# Patient Record
Sex: FEMALE | Race: BLACK OR AFRICAN AMERICAN | NOT HISPANIC OR LATINO | Employment: UNEMPLOYED | URBAN - METROPOLITAN AREA
[De-identification: names, ages, dates, MRNs, and addresses within clinical notes are randomized per-mention and may not be internally consistent; named-entity substitution may affect disease eponyms.]

---

## 2024-08-27 ENCOUNTER — APPOINTMENT (EMERGENCY)
Dept: CT IMAGING | Facility: HOSPITAL | Age: 34
End: 2024-08-27
Payer: COMMERCIAL

## 2024-08-27 ENCOUNTER — HOSPITAL ENCOUNTER (EMERGENCY)
Facility: HOSPITAL | Age: 34
Discharge: HOME | End: 2024-08-27
Attending: EMERGENCY MEDICINE | Admitting: EMERGENCY MEDICINE
Payer: COMMERCIAL

## 2024-08-27 VITALS
HEART RATE: 60 BPM | RESPIRATION RATE: 20 BRPM | OXYGEN SATURATION: 100 % | TEMPERATURE: 98.6 F | SYSTOLIC BLOOD PRESSURE: 137 MMHG | DIASTOLIC BLOOD PRESSURE: 71 MMHG

## 2024-08-27 DIAGNOSIS — R51.9 HEADACHE: ICD-10-CM

## 2024-08-27 DIAGNOSIS — G93.2 IDIOPATHIC INTRACRANIAL HYPERTENSION: ICD-10-CM

## 2024-08-27 DIAGNOSIS — J32.9 SINUSITIS: ICD-10-CM

## 2024-08-27 DIAGNOSIS — M79.10 MYALGIA: Primary | ICD-10-CM

## 2024-08-27 DIAGNOSIS — E23.6 EMPTY SELLA (HCC): ICD-10-CM

## 2024-08-27 LAB
ALBUMIN SERPL BCG-MCNC: 3.9 G/DL (ref 3.5–5)
ALP SERPL-CCNC: 52 U/L (ref 34–104)
ALT SERPL W P-5'-P-CCNC: 9 U/L (ref 7–52)
ANION GAP SERPL CALCULATED.3IONS-SCNC: 5 MMOL/L (ref 4–13)
AST SERPL W P-5'-P-CCNC: 14 U/L (ref 13–39)
BASOPHILS # BLD AUTO: 0.03 THOUSANDS/ÂΜL (ref 0–0.1)
BASOPHILS NFR BLD AUTO: 1 % (ref 0–1)
BILIRUB SERPL-MCNC: 0.47 MG/DL (ref 0.2–1)
BUN SERPL-MCNC: 12 MG/DL (ref 5–25)
CALCIUM SERPL-MCNC: 8.9 MG/DL (ref 8.4–10.2)
CHLORIDE SERPL-SCNC: 102 MMOL/L (ref 96–108)
CO2 SERPL-SCNC: 28 MMOL/L (ref 21–32)
CREAT SERPL-MCNC: 1.02 MG/DL (ref 0.6–1.3)
EOSINOPHIL # BLD AUTO: 0.07 THOUSAND/ÂΜL (ref 0–0.61)
EOSINOPHIL NFR BLD AUTO: 1 % (ref 0–6)
ERYTHROCYTE [DISTWIDTH] IN BLOOD BY AUTOMATED COUNT: 13.9 % (ref 11.6–15.1)
GFR SERPL CREATININE-BSD FRML MDRD: 70 ML/MIN/1.73SQ M
GLUCOSE SERPL-MCNC: 90 MG/DL (ref 65–140)
HCG SERPL QL: NEGATIVE
HCT VFR BLD AUTO: 35.8 % (ref 34.8–46.1)
HGB BLD-MCNC: 11.6 G/DL (ref 11.5–15.4)
IMM GRANULOCYTES # BLD AUTO: 0.04 THOUSAND/UL (ref 0–0.2)
IMM GRANULOCYTES NFR BLD AUTO: 1 % (ref 0–2)
LYMPHOCYTES # BLD AUTO: 2.23 THOUSANDS/ÂΜL (ref 0.6–4.47)
LYMPHOCYTES NFR BLD AUTO: 34 % (ref 14–44)
MAGNESIUM SERPL-MCNC: 1.8 MG/DL (ref 1.9–2.7)
MCH RBC QN AUTO: 25 PG (ref 26.8–34.3)
MCHC RBC AUTO-ENTMCNC: 32.4 G/DL (ref 31.4–37.4)
MCV RBC AUTO: 77 FL (ref 82–98)
MONOCYTES # BLD AUTO: 0.6 THOUSAND/ÂΜL (ref 0.17–1.22)
MONOCYTES NFR BLD AUTO: 9 % (ref 4–12)
NEUTROPHILS # BLD AUTO: 3.55 THOUSANDS/ÂΜL (ref 1.85–7.62)
NEUTS SEG NFR BLD AUTO: 54 % (ref 43–75)
NRBC BLD AUTO-RTO: 0 /100 WBCS
PLATELET # BLD AUTO: 319 THOUSANDS/UL (ref 149–390)
PMV BLD AUTO: 10.1 FL (ref 8.9–12.7)
POTASSIUM SERPL-SCNC: 3.9 MMOL/L (ref 3.5–5.3)
PROT SERPL-MCNC: 7.6 G/DL (ref 6.4–8.4)
RBC # BLD AUTO: 4.64 MILLION/UL (ref 3.81–5.12)
SODIUM SERPL-SCNC: 135 MMOL/L (ref 135–147)
TSH SERPL DL<=0.05 MIU/L-ACNC: 1.54 UIU/ML (ref 0.45–4.5)
WBC # BLD AUTO: 6.52 THOUSAND/UL (ref 4.31–10.16)

## 2024-08-27 PROCEDURE — 84443 ASSAY THYROID STIM HORMONE: CPT

## 2024-08-27 PROCEDURE — 99284 EMERGENCY DEPT VISIT MOD MDM: CPT | Performed by: EMERGENCY MEDICINE

## 2024-08-27 PROCEDURE — 83735 ASSAY OF MAGNESIUM: CPT

## 2024-08-27 PROCEDURE — 96375 TX/PRO/DX INJ NEW DRUG ADDON: CPT

## 2024-08-27 PROCEDURE — 80053 COMPREHEN METABOLIC PANEL: CPT

## 2024-08-27 PROCEDURE — 93005 ELECTROCARDIOGRAM TRACING: CPT

## 2024-08-27 PROCEDURE — 99285 EMERGENCY DEPT VISIT HI MDM: CPT

## 2024-08-27 PROCEDURE — 87636 SARSCOV2 & INF A&B AMP PRB: CPT

## 2024-08-27 PROCEDURE — 70450 CT HEAD/BRAIN W/O DYE: CPT

## 2024-08-27 PROCEDURE — 36415 COLL VENOUS BLD VENIPUNCTURE: CPT

## 2024-08-27 PROCEDURE — 96365 THER/PROPH/DIAG IV INF INIT: CPT

## 2024-08-27 PROCEDURE — 85025 COMPLETE CBC W/AUTO DIFF WBC: CPT

## 2024-08-27 PROCEDURE — 86618 LYME DISEASE ANTIBODY: CPT

## 2024-08-27 PROCEDURE — 96367 TX/PROPH/DG ADDL SEQ IV INF: CPT

## 2024-08-27 PROCEDURE — 84703 CHORIONIC GONADOTROPIN ASSAY: CPT

## 2024-08-27 RX ORDER — BUPROPION HYDROCHLORIDE 300 MG/1
300 TABLET ORAL DAILY
COMMUNITY

## 2024-08-27 RX ORDER — TRAZODONE HYDROCHLORIDE 300 MG/1
300 TABLET ORAL
COMMUNITY

## 2024-08-27 RX ORDER — DIPHENHYDRAMINE HYDROCHLORIDE 50 MG/ML
25 INJECTION INTRAMUSCULAR; INTRAVENOUS ONCE
Status: COMPLETED | OUTPATIENT
Start: 2024-08-27 | End: 2024-08-27

## 2024-08-27 RX ORDER — METOCLOPRAMIDE HYDROCHLORIDE 5 MG/ML
10 INJECTION INTRAMUSCULAR; INTRAVENOUS ONCE
Status: COMPLETED | OUTPATIENT
Start: 2024-08-27 | End: 2024-08-27

## 2024-08-27 RX ORDER — MAGNESIUM SULFATE HEPTAHYDRATE 40 MG/ML
2 INJECTION, SOLUTION INTRAVENOUS ONCE
Status: COMPLETED | OUTPATIENT
Start: 2024-08-27 | End: 2024-08-27

## 2024-08-27 RX ORDER — KETOROLAC TROMETHAMINE 30 MG/ML
15 INJECTION, SOLUTION INTRAMUSCULAR; INTRAVENOUS ONCE
Status: COMPLETED | OUTPATIENT
Start: 2024-08-27 | End: 2024-08-27

## 2024-08-27 RX ADMIN — SODIUM CHLORIDE, SODIUM LACTATE, POTASSIUM CHLORIDE, AND CALCIUM CHLORIDE 1000 ML: .6; .31; .03; .02 INJECTION, SOLUTION INTRAVENOUS at 21:19

## 2024-08-27 RX ADMIN — KETOROLAC TROMETHAMINE 15 MG: 30 INJECTION, SOLUTION INTRAMUSCULAR; INTRAVENOUS at 21:08

## 2024-08-27 RX ADMIN — DIPHENHYDRAMINE HYDROCHLORIDE 25 MG: 50 INJECTION, SOLUTION INTRAMUSCULAR; INTRAVENOUS at 21:09

## 2024-08-27 RX ADMIN — METOCLOPRAMIDE 10 MG: 5 INJECTION, SOLUTION INTRAMUSCULAR; INTRAVENOUS at 21:10

## 2024-08-27 RX ADMIN — MAGNESIUM SULFATE HEPTAHYDRATE 2 G: 40 INJECTION, SOLUTION INTRAVENOUS at 21:15

## 2024-08-27 NOTE — ED PROVIDER NOTES
"History  Chief Complaint   Patient presents with    Medical Problem     Pt BIB EMS from work. Pt states that she has been experiencing a headache and dizziness for the last several days. Pt also reports feeling more weak and tired than normal.      34 YOF with no documented PMH presents today with multiple complaints. Pt states she has had a headache for about 2 weeks, waxes and wanes in intensity. Is in the middle of her eyes. No medications seem to help. States she feels \"not myself\". Admits to body aches. Reports she feels like she has been losing track of time recently. States today she had a near syncopal episode at work, felt hot and sweating. Did not lose consciousness. States she has been having some intermittent light headedness- describes it as both off balance and room spinning. Reports back pain that has been ongoing \"for a while\". Denies urinary symptoms. Denies fevers, chills, chest pain, SOB, neck pain, rashes. No changes in vision. No recent travel.         Prior to Admission Medications   Prescriptions Last Dose Informant Patient Reported? Taking?   buPROPion (WELLBUTRIN XL) 300 mg 24 hr tablet Past Month  Yes Yes   Sig: Take 300 mg by mouth daily   traZODone (DESYREL) 300 MG tablet Past Month  Yes Yes   Sig: Take 300 mg by mouth daily at bedtime      Facility-Administered Medications: None       No past medical history on file.    No past surgical history on file.    No family history on file.  I have reviewed and agree with the history as documented.    No existing history information found.  No existing history information found.       Review of Systems   Constitutional:  Negative for chills and fever.   HENT:  Negative for congestion.    Respiratory:  Negative for cough and shortness of breath.    Cardiovascular:  Negative for chest pain and palpitations.   Gastrointestinal:  Negative for abdominal pain, diarrhea, nausea and vomiting.   Musculoskeletal:  Positive for myalgias.   Skin:  Negative " for rash.   Neurological:  Positive for dizziness, weakness, light-headedness and headaches.   All other systems reviewed and are negative.      Physical Exam  Physical Exam  Vitals and nursing note reviewed.   Constitutional:       General: She is not in acute distress.     Appearance: Normal appearance. She is well-developed. She is obese.   HENT:      Head: Normocephalic and atraumatic.   Eyes:      Conjunctiva/sclera: Conjunctivae normal.   Cardiovascular:      Rate and Rhythm: Normal rate and regular rhythm.      Heart sounds: No murmur heard.  Pulmonary:      Effort: Pulmonary effort is normal. No respiratory distress.      Breath sounds: Normal breath sounds.   Abdominal:      Palpations: Abdomen is soft.      Tenderness: There is no abdominal tenderness.   Musculoskeletal:         General: No swelling.      Cervical back: Normal range of motion and neck supple.   Skin:     General: Skin is warm and dry.      Capillary Refill: Capillary refill takes less than 2 seconds.   Neurological:      General: No focal deficit present.      Mental Status: She is alert and oriented to person, place, and time.   Psychiatric:         Mood and Affect: Mood normal.         Behavior: Behavior normal.         Vital Signs  ED Triage Vitals   Temperature Pulse Respirations Blood Pressure SpO2   08/27/24 1733 08/27/24 1720 08/27/24 1720 08/27/24 1720 08/27/24 1720   98.6 °F (37 °C) 60 20 137/71 100 %      Temp Source Heart Rate Source Patient Position - Orthostatic VS BP Location FiO2 (%)   08/27/24 1733 08/27/24 1720 08/27/24 1720 08/27/24 1720 --   Oral Monitor Lying Right arm       Pain Score       08/27/24 2106       9           Vitals:    08/27/24 1720   BP: 137/71   Pulse: 60   Patient Position - Orthostatic VS: Lying         Visual Acuity      ED Medications  Medications   ketorolac (TORADOL) injection 15 mg (15 mg Intravenous Given 8/27/24 2108)   metoclopramide (REGLAN) injection 10 mg (10 mg Intravenous Given 8/27/24  2110)   diphenhydrAMINE (BENADRYL) injection 25 mg (25 mg Intravenous Given 8/27/24 2109)   lactated ringers bolus 1,000 mL (1,000 mL Intravenous New Bag 8/27/24 2119)   magnesium sulfate 2 g/50 mL IVPB (premix) 2 g (0 g Intravenous Stopped 8/27/24 2143)       Diagnostic Studies  Results Reviewed       Procedure Component Value Units Date/Time    hCG, qualitative pregnancy [[id number]]  (Normal) Collected: 08/27/24 1741    Lab Status: Final result Specimen: Blood from Arm, Left Updated: 08/27/24 2043     Preg, Serum Negative    TSH, 3rd generation with Free T4 reflex [[id number]]  (Normal) Collected: 08/27/24 1741    Lab Status: Final result Specimen: Blood from Arm, Left Updated: 08/27/24 1831     TSH 3RD GENERATON 1.545 uIU/mL     Comprehensive metabolic panel [[id number]] Collected: 08/27/24 1741    Lab Status: Final result Specimen: Blood from Arm, Left Updated: 08/27/24 1817     Sodium 135 mmol/L      Potassium 3.9 mmol/L      Chloride 102 mmol/L      CO2 28 mmol/L      ANION GAP 5 mmol/L      BUN 12 mg/dL      Creatinine 1.02 mg/dL      Glucose 90 mg/dL      Calcium 8.9 mg/dL      AST 14 U/L      ALT 9 U/L      Alkaline Phosphatase 52 U/L      Total Protein 7.6 g/dL      Albumin 3.9 g/dL      Total Bilirubin 0.47 mg/dL      eGFR 70 ml/min/1.73sq m     Narrative:      National Kidney Disease Foundation guidelines for Chronic Kidney Disease (CKD):     Stage 1 with normal or high GFR (GFR > 90 mL/min/1.73 square meters)    Stage 2 Mild CKD (GFR = 60-89 mL/min/1.73 square meters)    Stage 3A Moderate CKD (GFR = 45-59 mL/min/1.73 square meters)    Stage 3B Moderate CKD (GFR = 30-44 mL/min/1.73 square meters)    Stage 4 Severe CKD (GFR = 15-29 mL/min/1.73 square meters)    Stage 5 End Stage CKD (GFR <15 mL/min/1.73 square meters)  Note: GFR calculation is accurate only with a steady state creatinine    Magnesium [[id number]]  (Abnormal) Collected: 08/27/24 1741    Lab Status: Final result Specimen: Blood from Arm, Left  Updated: 08/27/24 1817     Magnesium 1.8 mg/dL     CBC and differential [[id number]]  (Abnormal) Collected: 08/27/24 1741    Lab Status: Final result Specimen: Blood from Arm, Left Updated: 08/27/24 1757     WBC 6.52 Thousand/uL      RBC 4.64 Million/uL      Hemoglobin 11.6 g/dL      Hematocrit 35.8 %      MCV 77 fL      MCH 25.0 pg      MCHC 32.4 g/dL      RDW 13.9 %      MPV 10.1 fL      Platelets 319 Thousands/uL      nRBC 0 /100 WBCs      Segmented % 54 %      Immature Grans % 1 %      Lymphocytes % 34 %      Monocytes % 9 %      Eosinophils Relative 1 %      Basophils Relative 1 %      Absolute Neutrophils 3.55 Thousands/µL      Absolute Immature Grans 0.04 Thousand/uL      Absolute Lymphocytes 2.23 Thousands/µL      Absolute Monocytes 0.60 Thousand/µL      Eosinophils Absolute 0.07 Thousand/µL      Basophils Absolute 0.03 Thousands/µL     Lyme Total AB W Reflex to IGM/IGG [[id number]] Collected: 08/27/24 1741    Lab Status: In process Specimen: Blood from Arm, Left Updated: 08/27/24 1753    Narrative:      The following orders were created for panel order Lyme Total AB W Reflex to IGM/IGG.  Procedure                               Abnormality         Status                     ---------                               -----------         ------                     Lyme Total AB W Reflex t...[[id number]]                      In process                   Please view results for these tests on the individual orders.    Lyme Total AB W Reflex to IGM/IGG [[id number]] Collected: 08/27/24 1741    Lab Status: In process Specimen: Blood from Arm, Left Updated: 08/27/24 1753    FLU/COVID - if FLU clinically relevant [[id number]] Collected: 08/27/24 1741    Lab Status: In process Specimen: Nares from Nasopharyngeal Swab Updated: 08/27/24 1753                   CT head without contrast   Final Result by Pallav N Shah, MD (08/27 1859)      No acute intracranial abnormality.      Low normal ventricular size and prominence and  nearly empty sella raises the question of idiopathic intracranial hypertension. Patient history is recommended.                  Workstation performed: [id number]                    Procedures  Procedures         ED Course  ED Course as of 08/27/24 2222   Tue Aug 27, 2024   1902 CT head without contrast  No acute intracranial abnormality.     Low normal ventricular size and prominence and nearly empty sella raises the question of idiopathic intracranial hypertension. Patient history is recommended.     2120 Updated pt on results so far. Reports she feels like her symptoms may be related to her sinuses- reports pain and pressure. Admits to chills now    2151 Pt reports symptoms have improved.                                  SBIRT 22yo+      Flowsheet Row Most Recent Value   Initial Alcohol Screen: US AUDIT-C     1. How often do you have a drink containing alcohol? 0 Filed at: 08/27/2024 1723   2. How many drinks containing alcohol do you have on a typical day you are drinking?  0 Filed at: 08/27/2024 1723   3b. FEMALE Any Age, or MALE 65+: How often do you have 4 or more drinks on one occassion? 0 Filed at: 08/27/2024 1723   Audit-C Score 0 Filed at: 08/27/2024 1723   NAOMI: How many times in the past year have you...    Used an illegal drug or used a prescription medication for non-medical reasons? Never Filed at: 08/27/2024 1723                      Medical Decision Making  No WBC elevation to suspect infection somewhere. No electrolyte abnormalities. TSH normal. Mag normal. CT head showed question for idiopathic intracranial hypertension. Pt has no visual changes with the headache, so recommend neuro follow up. Headache and other symptoms did improve with medications here. Will treat for sinusitis per pt's request. Discussed motrin and tylenol for at home.     I have discussed the plan to discharge pt from ED. The patient was discharged in stable condition.  Patient ambulated off the department.  Extensive return  to emergency department precautions were discussed.  Follow up with appropriate providers including primary care physician was discussed.  Patient and/or their  primary decision maker expressed understanding.  Patient remained stable during entire emergency department stay.      Amount and/or Complexity of Data Reviewed  Labs: ordered.  Radiology: ordered. Decision-making details documented in ED Course.    Risk  Prescription drug management.                 Disposition  Final diagnoses:   Myalgia   Headache   Empty sella (HCC)   Idiopathic intracranial hypertension   Sinusitis     Time reflects when diagnosis was documented in both MDM as applicable and the Disposition within this note       Time User Action Codes Description Comment    8/27/2024  8:44 PM Jose Miguel Mullinsn Add [M79.10] Myalgia     8/27/2024  8:44 PM SusannaJose Miguel garnettn Add [R51.9] Headache     8/27/2024  9:42 PM SusannaJose Miguel garnettn Add [E23.6] Empty sella (HCC)     8/27/2024  9:42 PM SusannaErisilen Add [G93.2] Idiopathic intracranial hypertension     8/27/2024  9:51 PM Jose Miguel Mullinsn Add [J32.9] Sinusitis           ED Disposition       ED Disposition   Discharge    Condition   Stable    Date/Time   Tue Aug 27, 2024 2043    Comment   Elda Krueger discharge to home/self care.                   Follow-up Information       Follow up With Specialties Details Why Contact Info Additional Information    [facility]rology Schedule an appointment as soon as possible for a visit   [address]  [address] [facility] [address]  [phone] [facility], [address], [place] [address] [place], [place], [address]   [phone]            Patient's Medications   Discharge Prescriptions    AMOXICILLIN-CLAVULANATE (AUGMENTIN) 875-125 MG PER TABLET    Take 1 tablet by mouth every 12 (twelve) hours for 5 days       Start Date: 8/27/2024 End Date: 9/1/2024       Order Dose: 1 tablet        Quantity: 10 tablet    Refills: 0           PDMP Review       None            ED Provider  Electronically Signed by             Nayeli Mullins PA-C  08/27/24 8910

## 2024-08-28 LAB
ATRIAL RATE: 61 BPM
B BURGDOR IGG+IGM SER QL IA: NEGATIVE
FLUAV RNA RESP QL NAA+PROBE: NEGATIVE
FLUBV RNA RESP QL NAA+PROBE: NEGATIVE
P AXIS: 51 DEGREES
PR INTERVAL: 132 MS
QRS AXIS: 61 DEGREES
QRSD INTERVAL: 90 MS
QT INTERVAL: 398 MS
QTC INTERVAL: 400 MS
SARS-COV-2 RNA RESP QL NAA+PROBE: NEGATIVE
T WAVE AXIS: 37 DEGREES
VENTRICULAR RATE: 61 BPM

## 2024-08-28 PROCEDURE — 93010 ELECTROCARDIOGRAM REPORT: CPT | Performed by: INTERNAL MEDICINE

## 2024-08-28 NOTE — DISCHARGE INSTRUCTIONS
-schedule appointment with neurology   -take antibiotic as directed  -take motrin and tylenol for headaches and body aches

## 2024-10-08 ENCOUNTER — OFFICE VISIT (OUTPATIENT)
Dept: NEUROLOGY | Facility: CLINIC | Age: 34
End: 2024-10-08
Payer: MEDICARE

## 2024-10-08 VITALS
BODY MASS INDEX: 44.41 KG/M2 | HEIGHT: 68 IN | OXYGEN SATURATION: 98 % | TEMPERATURE: 97.3 F | HEART RATE: 75 BPM | WEIGHT: 293 LBS | SYSTOLIC BLOOD PRESSURE: 130 MMHG | DIASTOLIC BLOOD PRESSURE: 80 MMHG

## 2024-10-08 DIAGNOSIS — G43.009 MIGRAINE WITHOUT AURA AND WITHOUT STATUS MIGRAINOSUS, NOT INTRACTABLE: Primary | ICD-10-CM

## 2024-10-08 DIAGNOSIS — E23.6 EMPTY SELLA (HCC): ICD-10-CM

## 2024-10-08 DIAGNOSIS — R51.9 HEADACHE: ICD-10-CM

## 2024-10-08 DIAGNOSIS — G93.2 IDIOPATHIC INTRACRANIAL HYPERTENSION: ICD-10-CM

## 2024-10-08 PROCEDURE — 99204 OFFICE O/P NEW MOD 45 MIN: CPT | Performed by: NURSE PRACTITIONER

## 2024-10-08 RX ORDER — TOPIRAMATE 25 MG/1
TABLET, FILM COATED ORAL
Qty: 42 TABLET | Refills: 1 | Status: SHIPPED | OUTPATIENT
Start: 2024-10-08

## 2024-10-08 RX ORDER — TOPIRAMATE 25 MG/1
TABLET, FILM COATED ORAL
Qty: 42 TABLET | Refills: 0 | Status: SHIPPED | OUTPATIENT
Start: 2024-10-08 | End: 2024-10-08

## 2024-10-08 NOTE — PROGRESS NOTES
Neurology Ambulatory Visit  Name: Elda Krueger       : 1990       MRN: [id number]   Encounter Provider: Juan Diego Marshall MA   Encounter Date: 10/8/2024  Encounter department: [facility]    Assessment and Plan  1. Migraine without aura and without status migrainosus, not intractable  -     MRI brain without contrast; Future; Expected date: 10/08/2024  -     MRV head wo contrast; Future; Expected date: 10/08/2024  -     topiramate (TOPAMAX) 25 mg tablet; Start with 1 tablet for 5 days.  Increase to 2 tablets for 5 days.  3 tablets for 5 days then 4 tablets at bedtime (change to 100 mg tab)  2. Headache  -     Ambulatory Referral to Neurology  3. Empty sella (HCC)  -     Ambulatory Referral to Neurology  -     MRV head wo contrast; Future; Expected date: 10/08/2024  4. Idiopathic intracranial hypertension  -     Ambulatory Referral to Neurology  -     MRI brain without contrast; Future; Expected date: 10/08/2024  -     MRV head wo contrast; Future; Expected date: 10/08/2024  -     Ambulatory Referral to Ophthalmology; Future  -     topiramate (TOPAMAX) 25 mg tablet; Start with 1 tablet for 5 days.  Increase to 2 tablets for 5 days.  3 tablets for 5 days then 4 tablets at bedtime (change to 100 mg tab)    Patient Instructions:  Suggest starting topamax as discussed, getting repeat ophthalmology evaluation and MRI/MRV as discussed  Follow up in 8 weeks time    She will Return in about 8 weeks (around 12/3/2024).    History of Present Illness     HPI   Elda Krueger is a 34 y.o. female with past medical history of anxiety, depression, insomnia, obesity, migraine (many years) who presents for new patient headache evaluation. She was referred from the ED 2024. She is alone today. She did recently see her primary care physician and there is plans to see bariatric medicine and complete a sleep study. She had an abnormal CT head showing signs concerning for IIH. Elda  states she will get headaches 3-5x/week; sometimes she will wake up with them; she states the headache can last all day if not treated but if she uses excedrin it is usually relieved in a few hours time; the headache is mainly frontal/temporal and is described as throbbing/pressure pain; it is associated with eye tearing, nausea, concentration difficulty, photophobia/phonophobia. She does state headaches were under better control when she had lost weight - down to 270lbs at one point but recently gained the weight back. She denies family history of headaches or other neurological problems. She denies tinnitus; she denies positional or valsalva induced headaches. She states she hydrates well. She states she has had a recent eye exam but not a complete one-went to vision center in [place]. 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 She has no plans for near future pregnancy; she is acceptable to better preventative therapy and understands she can use excedrin but is cautioned about overuse.     CT head 8/27/2024:  FINDINGS:  PARENCHYMA:  No intracranial mass, mass effect or midline shift. No CT signs of acute infarction.  No acute parenchymal hemorrhage. The cerebellar tonsils are normal in position.  VENTRICLES AND EXTRA-AXIAL SPACES: The ventricles are low normal in size. No hydrocephalus. Nearly empty sella.  VISUALIZED ORBITS:  Normal visualized orbits. Globes remain rounded in contour. No definite tortuosity or enlargement of the optic nerve sheath complexes.  PARANASAL SINUSES:  Normal visualized paranasal sinuses. Incidentally noted under pneumatization of the mastoids.  CALVARIUM AND EXTRACRANIAL SOFT TISSUES:  Normal.  IMPRESSION:  No acute intracranial abnormality.  Low normal ventricular size and prominence and nearly empty sella raises the question of idiopathic intracranial hypertension. Patient history  "is recommended.    Review of Systems   Constitutional:  Positive for fatigue.   HENT: Negative.     Eyes: Negative.    Respiratory: Negative.     Cardiovascular: Negative.    Gastrointestinal: Negative.    Endocrine: Negative.    Genitourinary: Negative.    Musculoskeletal: Negative.    Skin: Negative.    Allergic/Immunologic: Negative.    Neurological:  Positive for numbness and headaches (located behind eyes goes towards the temples).   Hematological: Negative.    Psychiatric/Behavioral: Negative.     Pt currently taking Excedrin for headaches  Pain is throbbing and stabbing pain  Pain gets to a 10 at times  ROS was reviewed and updated as appropriate    Current Outpatient Medications on File Prior to Visit   Medication Sig Dispense Refill    buPROPion (WELLBUTRIN XL) 300 mg 24 hr tablet Take 300 mg by mouth daily      busPIRone (BUSPAR) 5 mg tablet Take 5 mg by mouth 2 (two) times a day      traZODone (DESYREL) 300 MG tablet Take 300 mg by mouth daily at bedtime      Prenatal w/o A Vit-Fe Fum-FA (Prenatal-U) 106.5-1 MG CAPS Take 1 capsule by mouth daily (Patient not taking: Reported on 10/8/2024)       No current facility-administered medications on file prior to visit.      Social History     Tobacco Use    Smoking status: Never     Passive exposure: Never    Smokeless tobacco: Never   Substance and Sexual Activity    Alcohol use: Not on file    Drug use: Not on file    Sexual activity: Not on file       Objective     /80 (BP Location: Right arm, Patient Position: Sitting, Cuff Size: Standard)   Pulse 75   Temp (!) 97.3 °F (36.3 °C) (Temporal)   Ht 5' 8\" (1.727 m)   Wt (!) 139 kg (305 lb 12.8 oz)   SpO2 98%   BMI 46.50 kg/m²    Physical Exam  Vitals reviewed.   Constitutional:       General: She is not in acute distress.     Appearance: She is obese.   HENT:      Head: Normocephalic.      Right Ear: External ear normal.      Left Ear: External ear normal.      Nose: Nose normal.      Mouth/Throat:    "   Pharynx: Oropharynx is clear.      Comments: Crowded oropharynx  Eyes:      General: Lids are normal.         Right eye: No discharge.         Left eye: No discharge.      Extraocular Movements: Extraocular movements intact.      Pupils: Pupils are equal, round, and reactive to light.   Cardiovascular:      Pulses: Normal pulses.   Pulmonary:      Effort: Pulmonary effort is normal.      Breath sounds: Normal breath sounds.   Abdominal:      General: There is no distension.      Tenderness: There is no abdominal tenderness.   Musculoskeletal:      Right lower leg: No edema.      Left lower leg: No edema.   Skin:     Capillary Refill: Capillary refill takes less than 2 seconds.      Findings: No rash.   Neurological:      General: No focal deficit present.      Mental Status: She is alert.      Cranial Nerves: No cranial nerve deficit.      Sensory: No sensory deficit.      Motor: Motor strength is normal.No weakness.      Coordination: Romberg sign negative. Coordination normal.      Gait: Gait normal.      Deep Tendon Reflexes: Reflexes normal.      Reflex Scores:       Brachioradialis reflexes are 1+ on the right side and 1+ on the left side.       Patellar reflexes are 1+ on the right side and 1+ on the left side.     Comments: Difficult to visualize optic disc on fundoscopic exam   Psychiatric:         Mood and Affect: Mood normal.         Speech: Speech normal.       Neurological Exam  Mental Status  Alert. Oriented to person, place and time. Speech is normal. Language is fluent with no aphasia.    Cranial Nerves  CN II: Visual acuity is normal. Visual fields full to confrontation.  CN III, IV, VI: Extraocular movements intact bilaterally. Normal lids and orbits bilaterally. Pupils equal round and reactive to light bilaterally.  CN V: Facial sensation is normal.  CN VII: Full and symmetric facial movement.  CN VIII: Hearing is normal.  CN IX, X: Palate elevates symmetrically. Normal gag reflex.  CN XI:  Shoulder shrug strength is normal.  CN XII: Tongue midline without atrophy or fasciculations.    Motor  Normal muscle bulk throughout. Strength is 5/5 throughout all four extremities.    Sensory  Light touch is normal in upper and lower extremities.     Reflexes                                            Right                      Left  Brachioradialis                    1+                         1+  Patellar                                1+                         1+    Coordination  Right: Finger-to-nose normal.Left: Finger-to-nose normal.    Gait   Normal gait.Casual gait is normal including stance, stride, and arm swing. Romberg is absent. Able to rise from chair without using arms.  Difficult to visualize optic disc on fundoscopic exam.

## 2024-10-08 NOTE — PATIENT INSTRUCTIONS
Suggest starting topamax as discussed, getting repeat ophthalmology evaluation and MRI/MRV as discussed  Follow up in 8 weeks time

## 2024-10-29 ENCOUNTER — HOSPITAL ENCOUNTER (OUTPATIENT)
Facility: MEDICAL CENTER | Age: 34
Discharge: HOME | End: 2024-10-29
Payer: MEDICARE

## 2024-10-29 DIAGNOSIS — G93.2 IDIOPATHIC INTRACRANIAL HYPERTENSION: ICD-10-CM

## 2024-10-29 DIAGNOSIS — G43.009 MIGRAINE WITHOUT AURA AND WITHOUT STATUS MIGRAINOSUS, NOT INTRACTABLE: ICD-10-CM

## 2024-10-29 DIAGNOSIS — E23.6 EMPTY SELLA (HCC): ICD-10-CM

## 2024-10-29 PROCEDURE — 70544 MR ANGIOGRAPHY HEAD W/O DYE: CPT

## 2024-10-29 PROCEDURE — 70551 MRI BRAIN STEM W/O DYE: CPT

## 2024-12-19 ENCOUNTER — TELEPHONE (OUTPATIENT)
Dept: NEUROLOGY | Facility: CLINIC | Age: 34
End: 2024-12-19